# Patient Record
Sex: FEMALE | URBAN - METROPOLITAN AREA
[De-identification: names, ages, dates, MRNs, and addresses within clinical notes are randomized per-mention and may not be internally consistent; named-entity substitution may affect disease eponyms.]

---

## 2022-12-28 ENCOUNTER — OFFICE VISIT (OUTPATIENT)
Dept: DENTISTRY | Facility: CLINIC | Age: 32
End: 2022-12-28

## 2022-12-28 VITALS — SYSTOLIC BLOOD PRESSURE: 125 MMHG | DIASTOLIC BLOOD PRESSURE: 79 MMHG | TEMPERATURE: 97.1 F | HEART RATE: 75 BPM

## 2022-12-28 DIAGNOSIS — K04.7 DENTAL INFECTION: Primary | ICD-10-CM

## 2022-12-28 RX ORDER — AMOXICILLIN 500 MG/1
500 CAPSULE ORAL EVERY 8 HOURS SCHEDULED
Qty: 21 CAPSULE | Refills: 0 | Status: SHIPPED | OUTPATIENT
Start: 2022-12-28 | End: 2023-01-04

## 2022-12-28 NOTE — PROGRESS NOTES
EMERGENCY EXAM    Troy Carrion, 33 y o  female reports to clinic for pain in the LL quadrant  CC: throbbing pain to ear started yesterday     Pt consents to exam and understands limited scope of today's visit  Significant medical history: no sig med history - pt smokes hooka  Allergies: pineapples   LDV: May 2021 [facility] for cleanings  ASA: 1    Pain is a 8/10  Pain began 1 day ago  Pt is taking advil and amoxicillin for pain  PT reports taking amoxicillin that she had left over  Pt reports pain comes and goes and she feels the pain in her ear  Pt reports pain waking her up at night  Exam/findings:  - overall poor oral hygiene   - multiple fractured teeth   - #18 D and O decay   - #14 MO decay  - #15 DO decay   - #16 fractured to the pulp   - Slightly swollen lower left cheek     Radiographic findings:   - #18 D and O decay   - #14 MO decay below bone level   - #15 DO decay   - #1 D decay  - #32 medially inclined and partially impacted   - Possible idiopathic osteosclerosis (dense bony island) inferior to tooth #19 in the mandible     Discussed:  - #1, 14, 16, 32 extractions with OS    - comp exam with us     Treatment:  - PA, Luis Enrique, Limited exam  - Prescribed Amoxicillin 500mg BID 7 days - advised pt to stop taking old amox prescription and start taking new one - pt understood  - OS referral given to pt - STAT    Pt left satisfied and ambulatory       NV: extractions with OS   NNV: comp exam

## 2023-01-12 ENCOUNTER — OFFICE VISIT (OUTPATIENT)
Dept: OBGYN CLINIC | Facility: CLINIC | Age: 33
End: 2023-01-12

## 2023-01-12 VITALS
DIASTOLIC BLOOD PRESSURE: 75 MMHG | HEIGHT: 62 IN | SYSTOLIC BLOOD PRESSURE: 117 MMHG | HEART RATE: 89 BPM | BODY MASS INDEX: 32.2 KG/M2 | WEIGHT: 175 LBS

## 2023-01-12 DIAGNOSIS — Z23 NEED FOR HPV VACCINATION: ICD-10-CM

## 2023-01-12 DIAGNOSIS — Z59.9 FINANCIAL DIFFICULTIES: ICD-10-CM

## 2023-01-12 DIAGNOSIS — Z01.419 ENCOUNTER FOR ANNUAL ROUTINE GYNECOLOGICAL EXAMINATION: Primary | ICD-10-CM

## 2023-01-12 DIAGNOSIS — Z72.51 HIGH RISK HETEROSEXUAL BEHAVIOR: ICD-10-CM

## 2023-01-12 DIAGNOSIS — Z11.3 SCREEN FOR STD (SEXUALLY TRANSMITTED DISEASE): ICD-10-CM

## 2023-01-12 DIAGNOSIS — Z80.3 FAMILY HISTORY OF BREAST CANCER: ICD-10-CM

## 2023-01-12 DIAGNOSIS — E66.9 CLASS 1 OBESITY WITHOUT SERIOUS COMORBIDITY WITH BODY MASS INDEX (BMI) OF 32.0 TO 32.9 IN ADULT, UNSPECIFIED OBESITY TYPE: ICD-10-CM

## 2023-01-12 LAB — SL AMB POCT URINE HCG: NEGATIVE

## 2023-01-12 RX ORDER — CYCLOBENZAPRINE HCL 5 MG
1 TABLET ORAL EVERY 8 HOURS PRN
COMMUNITY
Start: 2022-10-27

## 2023-01-12 SDOH — ECONOMIC STABILITY - INCOME SECURITY: PROBLEM RELATED TO HOUSING AND ECONOMIC CIRCUMSTANCES, UNSPECIFIED: Z59.9
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
## 2023-01-13 ENCOUNTER — TELEPHONE (OUTPATIENT)
Dept: GENETICS | Facility: CLINIC | Age: 33
End: 2023-01-13

## 2023-01-13 ENCOUNTER — PATIENT OUTREACH (OUTPATIENT)
Dept: OBGYN CLINIC | Facility: CLINIC | Age: 33
End: 2023-01-13

## 2023-01-13 NOTE — TELEPHONE ENCOUNTER
I called Donna Flores to schedule a new patient appointment with the [facility]       Outcome:   I left a voice message encouraging the patient to call the genetics team at [phone] to schedule this appointment  Follow-up:   At this time the referral will be closed and we will wait to hear back from the patient regarding scheduling this appointment
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
## 2023-01-14 LAB
C TRACH DNA SPEC QL NAA+PROBE: NEGATIVE
N GONORRHOEA DNA SPEC QL NAA+PROBE: NEGATIVE

## 2023-01-17 LAB
HPV HR 12 DNA CVX QL NAA+PROBE: NEGATIVE
HPV16 DNA CVX QL NAA+PROBE: NEGATIVE
HPV18 DNA CVX QL NAA+PROBE: NEGATIVE

## 2023-01-20 LAB
LAB AP GYN PRIMARY INTERPRETATION: NORMAL
Lab: NORMAL
PATH INTERP SPEC-IMP: NORMAL

## 2023-03-09 ENCOUNTER — ULTRASOUND (OUTPATIENT)
Dept: OBGYN CLINIC | Facility: CLINIC | Age: 33
End: 2023-03-09

## 2023-03-09 VITALS
SYSTOLIC BLOOD PRESSURE: 126 MMHG | HEART RATE: 92 BPM | HEIGHT: 62 IN | DIASTOLIC BLOOD PRESSURE: 71 MMHG | BODY MASS INDEX: 31.17 KG/M2 | WEIGHT: 169.4 LBS

## 2023-03-09 DIAGNOSIS — Z3A.08 8 WEEKS GESTATION OF PREGNANCY: Primary | ICD-10-CM

## 2023-03-09 PROBLEM — O21.9 NAUSEA/VOMITING IN PREGNANCY: Status: ACTIVE | Noted: 2023-03-09

## 2023-03-09 RX ORDER — PYRIDOXINE HCL (VITAMIN B6) 25 MG
25 TABLET ORAL 3 TIMES DAILY
Qty: 90 TABLET | Refills: 3 | Status: SHIPPED | OUTPATIENT
Start: 2023-03-09

## 2023-03-09 RX ORDER — ONDANSETRON 4 MG/1
4 TABLET, FILM COATED ORAL EVERY 6 HOURS PRN
Qty: 20 TABLET | Refills: 3 | Status: SHIPPED | OUTPATIENT
Start: 2023-03-09

## 2023-03-09 RX ORDER — SWAB
1 SWAB, NON-MEDICATED MISCELLANEOUS DAILY
COMMUNITY

## 2023-03-09 NOTE — PROGRESS NOTES
Lev Sanjuana Hayes; 1990  [id number]      ASSESSMENT:  33 y o   presenting for amenorrhea  Pregnancy confirmed, dating by US due to unknown LMP  ALEKSANDRA 10/14/2023  PLAN:  - Continue daily PNV  - Prenatal labs ordered  - Unisom, B6, and Zofran ordered for nausea/vomiting  - Prenatal Nursing Intake in 2 weeks  - Prenatal H&P in 4 weeks    __________________________________________________________      SUBJECTIVE:  Charlotte Hilton is a 33 y o   with an unknown LMP who presents for amenorrhea  She presented to the ED on 23, at which time urine pregnancy test was positive and per the patient (who has photos of the ultrasound performed), ALEKSANDRA was 10/14/23 based by measurement of a fetal pole  Upon review, fetal pole in those images is sub-optimal as there is no clear crown or rump, but dating from that scan matched dating performed today  She is currently otherwise without complaint  She had a small amount of spotting early in the pregnancy which has since resolved  Patient notes that this pregnancy was unplanned but is desired  She was not using contraception at the time  She is uncertain of her LMP due to PCOS and irregular menses  OBJECTIVE:  /71   Pulse 92   Ht 5' 2" (1 575 m)   Wt 76 8 kg (169 lb 6 4 oz)   LMP  (LMP Unknown)   BMI 30 98 kg/m²     Physical Exam  Constitutional:       General: She is not in acute distress  Appearance: Normal appearance  She is not ill-appearing  HENT:      Mouth/Throat:      Mouth: Mucous membranes are moist    Eyes:      Extraocular Movements: Extraocular movements intact  Cardiovascular:      Rate and Rhythm: Normal rate  Pulmonary:      Effort: Pulmonary effort is normal    Abdominal:      General: There is no distension  Palpations: Abdomen is soft  Tenderness: There is no abdominal tenderness  There is no guarding  Musculoskeletal:         General: No swelling        Right lower leg: No edema  Left lower leg: No edema  Neurological:      General: No focal deficit present  Mental Status: She is alert  Skin:     General: Skin is warm and dry  Coloration: Skin is not jaundiced or pale  Psychiatric:         Mood and Affect: Mood normal          Behavior: Behavior normal          Thought Content: Thought content normal          Judgment: Judgment normal        Transvaginal Pelvic Ultrasound:  [facility] IUP  CRL 2 07 cm, consistent with EGA 8w5d  +yolk sac  +cardiac activity    No adnexal masses appreciated  Sagittal view of fetus unable to be obtained, and so CRL determined from coronal plane though noted to be consistent w/ prior ED dating (details listed in subjective portion of note)              Jose Valentino MD  03/09/23  3:00 PM